# Patient Record
Sex: MALE | Race: WHITE
[De-identification: names, ages, dates, MRNs, and addresses within clinical notes are randomized per-mention and may not be internally consistent; named-entity substitution may affect disease eponyms.]

---

## 2021-04-26 ENCOUNTER — HOSPITAL ENCOUNTER (EMERGENCY)
Dept: HOSPITAL 63 - ER | Age: 41
Discharge: HOME | End: 2021-04-26
Payer: OTHER GOVERNMENT

## 2021-04-26 VITALS — BODY MASS INDEX: 30.9 KG/M2 | WEIGHT: 215.83 LBS | HEIGHT: 70 IN

## 2021-04-26 VITALS — DIASTOLIC BLOOD PRESSURE: 81 MMHG | SYSTOLIC BLOOD PRESSURE: 185 MMHG
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
## 2021-04-26 DIAGNOSIS — S93.402A: Primary | ICD-10-CM
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
## 2021-04-26 PROCEDURE — 73610 X-RAY EXAM OF ANKLE: CPT

## 2021-04-26 PROCEDURE — 99283 EMERGENCY DEPT VISIT LOW MDM: CPT

## 2021-04-26 NOTE — PHYS DOC
Past History


Past Medical History:  No Pertinent History


 (BRANDY REA DO)


Past Surgical History:  No Surgical History


 (BRANDY REA DO)


Smoking:  Non-smoker


Alcohol Use:  None


Drug Use:  None


 (BRANDY REA DO)





General Adult


EDM:


Chief Complaint:  ANKLE PROBLEM





HPI:


HPI:





Patient is a 40-year-old male who presents with left ankle pain.  Patient states

that he was running when he rolled his left ankle.  Patient reports incident 

happened on Saturday.  Patient's been using ice, ankle brace, taking ibuprofen 

and elevating.  Patient states he still unable to bear weight.  Patient took 400

mg ibuprofen a few hours ago.  Denies health history.


 (FRANCISCO SAUNDERS)





Review of Systems:


Review of Systems:


Constitutional:  Denies fever or chills 


Respiratory:  Denies cough or shortness of breath 


Cardiovascular:  Denies chest pain or edema 


Musculoskeletal: Reports left ankle pain


 (FRANCISCO SAUNDERS)





Physical Exam:


PE:





Constitutional: Well developed, well nourished, no acute distress, non-toxic 

appearance. []


Cardiovascular:Heart rate regular rhythm, no murmur []


Lungs & Thorax:  Bilateral breath sounds clear to auscultation []


Extremities: Left ankle tenderness,  ROM intact, no edema. [] 


 (FRANCISCO ASUNDERS)





EKG:


EKG:


[]


 (FRANCISCO SAUNDERS)





Radiology/Procedures:


Radiology/Procedures:


[]EXAM: Left ankle, 3 views.





HISTORY: Rolled ankle.





COMPARISON: None.





FINDINGS: 3 views of the left ankle are obtained. There are chronic appearing 

corticated ossicles inferior to the medial and lateral malleoli, likely due to 

chronic nonunited fracture fragments. No convincing acute fracture is seen. 

There is ankle soft tissue swelling. There is no osteochondral lesion. There is 

enthesopathy at Achilles tendon insertion.





IMPRESSION: 


1. Soft tissue swelling. No convincing acute fracture.


2. 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 

lateral malleoli.





Electronically signed by: Leticia Ronquillo MD (4/26/2021 4:16 PM) [id number]


 (FRANCISCO SAUNDERS)





Heart Score:


C/O Chest Pain:  No


Risk Factors:


Risk Factors:  DM, Current or recent (<one month) smoker, HTN, HLP, family 

history of CAD, obesity.


Risk Scores:


Score 0 - 3:  2.5% MACE over next 6 weeks - Discharge Home


Score 4 - 6:  20.3% MACE over next 6 weeks - Admit for Clinical Observation


Score 7 - 10:  72.7% MACE over next 6 weeks - Early Invasive Strategies


 (FRANCISCO SAUNDERS)





Course & Med Decision Making:


Course & Med Decision Making


Pertinent Labs and Imaging studies reviewed. (See chart for details)





[] Patient presents to emergency room with left ankle pain after rolling his 

ankle while running.  Patient reports that he was injured on Saturday.  

Patient's been using ice, ankle brace, ibuprofen, and elevation.  Patient states

 that he is unable to bear weight.  Left ankle x-ray ordered to rule out 

fracture.  Pedal pulses intact.  Patient took ibuprofen a few hours ago.  

Denying needing anything for pain at this time.  X-ray of ankle is negative for 

acute fracture.  Patient to continue rice instructions and ibuprofen.  Patient 

continues to have pain, follow-up with PCP.  Patient can return to emergency 

room with worsening symptoms or concerns.


 (FRANCISCO SAUNDERS)





Dragon Disclaimer:


Dragon Disclaimer:


This electronic medical record was generated, in whole or in part, using a voice

 recognition dictation system.


 (FRANCISCO SAUNDERS)





Departure


Departure:


Impression:  


   Primary Impression:  


   Ankle sprain


   Qualified Codes:  S93.402A - Sprain of unspecified ligament of left ankle, 

   initial encounter


Disposition:  01 HOME / SELF CARE / HOMELESS


Condition:  STABLE


Referrals:  


LADONNA TEIXEIRA DO (PCP)


Patient Instructions:  Ankle Sprain





Additional Instructions:  


You were seen in the emergency room for left ankle pain after rolling your ankle

 on Saturday.  X-ray of your left ankle was negative for fracture.  Continue to 

rest, ice, elevate, and ibuprofen for pain.  Follow-up with PCP if you continue 

to have discomfort.  You can always return to the emergency room with worsening 

symptoms or concerns.





EMERGENCY DEPARTMENT GENERAL DISCHARGE INSTRUCTIONS





Thank you for coming to [facility] (ED) today and trusting us

 with you 


care.  We trust that you had a positivie experience in our Emergency Department.

  If you 


wish to speak to the department management, you may call the director at 

[phone].





YOUR FOLLOW UP INSTRUCTIONS ARE AS FOLLOWS:





1.  Do you have a private Doctor?  If you do not have a private doctor, please 

ask for a 


resource list of physicians or clinics that may be able to assist you with 

follow up care.





2.  The Emergency Physician has interpreted your x-rays.  The X-Ray specialist 

will also 


review them.  If there is a change in the findings, you will be notified in 48 

hours when at 


all possible.





3.  A lab test or culture has been done, your results will be reviewed and you 

will be 


notified if you need a change in treatment.





ADDITIONAL INSTRUCTIONS AND INFORMATION:





1.  Your care today has been supervised by a physician who is specially trained 

in emergency 


care.  Many problems require more than one evaluation for a complete diagnosis 

and 


treatment.  We recommend that you schedule your follow up appointment as 

recommended to 


ensure complete treatment of you illness or injury.  If you are unable to obtain

 follow up 


care and continue to have a problem, or if your condition worsens, we recommend 

that you 


return to the ED.





2.  We are not able to safely determine your condition over the phone nor are we

 able to 


give sound medical advice over the phone.  For these safety reasons, if you call

 for medical 


advice we will ask you to come to the ED for further evaluation.





3.  If you have any questions regarding these discharge instructions please call

 the ED at 


[phone].





SAFETY INFORMATION:





In the interest of safety, wellness, and injury prevention; we encourage you to 

wear your 


sealbelt, if you smoke; quite smoking, and we encourage family to use a 

protective helmet 


for bicycling and other sporting events that present an increased risk for head 

injury.





IF YOUR SYMPTOMS WORSEN OR NEW SYMPTOMS DEVELOP, OR YOU HAVE CONCERNS ABOUT YOUR

 CONDITION; 


OR IF YOUR CONDITION WORSENS WHILE YOU ARE WAITING FOR YOUR FOLLOW UP 

APPOINTMENT; EITHER 


CONTACT YOUR PRIMARY CARE DOCTOR, THE PHYSICIAN WHOSE NAME AND NUMBER YOU WERE 

GIVEN, OR 


RETURN TO THE ED IMMEDIATELY.





Attending Signature


Attending Signature


I have reviewed the PA/NP's note and plan of care. I was available for 

consultation as needed during the patient's visit in the emergency department. I

 agree with the clinical impression, plan, and disposition.


 (BRANDY REA DO)











FRANCISCO SAUNDERS               Apr 26, 2021 16:20


BRANDY REA DO             Apr 26, 2021 20:22

## 2021-04-26 NOTE — RAD
EXAM: Left ankle, 3 views.



HISTORY: Rolled ankle.



COMPARISON: None.



FINDINGS: 3 views of the left ankle are obtained. There are chronic appearing corticated ossicles inf
erior to the medial and lateral malleoli, likely due to chronic nonunited fracture fragments. No conv
incing acute fracture is seen. There is ankle soft tissue swelling. There is no osteochondral lesion.
 There is enthesopathy at Achilles tendon insertion.



IMPRESSION: 

1. Soft tissue swelling. No convincing acute fracture.

2. 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



Electronically signed by: Leticia Ronquillo MD (4/26/2021 4:16 PM) [id number]